# Patient Record
Sex: FEMALE | Race: WHITE | NOT HISPANIC OR LATINO | Employment: OTHER | URBAN - METROPOLITAN AREA
[De-identification: names, ages, dates, MRNs, and addresses within clinical notes are randomized per-mention and may not be internally consistent; named-entity substitution may affect disease eponyms.]

---

## 2017-01-06 ENCOUNTER — OFFICE VISIT (OUTPATIENT)
Dept: NEUROLOGY | Facility: CLINIC | Age: 82
End: 2017-01-06

## 2017-01-06 VITALS
WEIGHT: 166 LBS | SYSTOLIC BLOOD PRESSURE: 128 MMHG | HEIGHT: 65 IN | DIASTOLIC BLOOD PRESSURE: 70 MMHG | BODY MASS INDEX: 27.66 KG/M2

## 2017-01-06 DIAGNOSIS — F02.818 LATE ONSET ALZHEIMER'S DISEASE WITH BEHAVIORAL DISTURBANCE (HCC): Primary | ICD-10-CM

## 2017-01-06 DIAGNOSIS — G30.1 LATE ONSET ALZHEIMER'S DISEASE WITH BEHAVIORAL DISTURBANCE (HCC): Primary | ICD-10-CM

## 2017-01-06 PROCEDURE — 99213 OFFICE O/P EST LOW 20 MIN: CPT | Performed by: PHYSICIAN ASSISTANT

## 2017-01-06 NOTE — PROGRESS NOTES
"Subjective     History of Present Illness   Carmen Lopez is a 89 y.o. female who returns to clinic today for evaluation of Alzheimer's Disease . Additional symptoms have included impairments in orientation , language and executive function. She has had associated  symptoms of anxiety and delusions, which have improved significantly since starting mirtazapine. She is currently residing at [facility].      Prior evaluation has included screening blood work and a head CT  which was unremarkable . She is currently taking donepezil, memantine and mirtazapine.    Since her last visit in 7/16, she feels essentially unchanged and her family agrees.       The following portions of the patient's history were reviewed and updated as appropriate: allergies, current medications, past family history, past medical history, past social history, past surgical history and problem list.    Review of Systems   Constitutional: Negative.    HENT: Negative.    Eyes: Negative.    Respiratory: Negative.    Cardiovascular: Negative.    Gastrointestinal: Negative.    Endocrine: Negative.    Genitourinary: Negative.    Musculoskeletal: Negative.    Skin: Negative.    Allergic/Immunologic: Negative.    Neurological:        As noted in HPI   Hematological: Negative.    Psychiatric/Behavioral:        As noted in HPI       Objective     Visit Vitals   • /70   • Ht 65\" (165.1 cm)   • Wt 166 lb (75.3 kg)   • BMI 27.62 kg/m2       General appearance today is normal.       Physical Exam   Neurological: She has normal strength. She has a normal Finger-Nose-Finger Test.   Psychiatric: Her speech is normal.        Neurologic Exam     Mental Status   Oriented to person.   Disoriented to place.   Disoriented to time. Disoriented to year, month, date and day. Oriented to season.   Registration: recalls 3 of 3 objects. Recall of objects at 5 minutes: recalls 0 of 3 objects. Follows 3 step commands.   Attention: 0/5 sequencing.   Speech: speech is " normal   Level of consciousness: alert  Able to name object. Able to read. Able to repeat. Able to write.     Cranial Nerves   Cranial nerves II through XII intact.     Motor Exam   Muscle bulk: normal  Overall muscle tone: normal    Strength   Strength 5/5 throughout.     Sensory Exam   Light touch normal.     Gait, Coordination, and Reflexes     Coordination   Finger to nose coordination: normal    Tremor   Resting tremor: absent        Results  MMSE=13      Assessment/Plan   Carmen was seen today for alzheimer's disease.    Diagnoses and all orders for this visit:    Late onset Alzheimer's disease with behavioral disturbance          Discussion/Summary   Carmen Lopez returns to clinic today for evaluation of Alzheimer's Disease . I again reviewed her current status and treatment options. After discussing potential treatment options, it was elected to continue on  donepezil, memantine and mirtazapine unchanged. She will then follow up in 6 months, or sooner if needed.       I spent 15 minutes with the patient and family. I spent 80 percent of this time counseling and discussing evaluation, current status, treatment options and management.    As part of this visit I obtained additional history from the family which is incorporated in the HPI.    Annette Rizo PA-C

## 2017-01-06 NOTE — MR AVS SNAPSHOT
Carmen Lopez   1/6/2017 10:30 AM   Office Visit    Dept Phone:  [phone]   Encounter #:  [id number]    Provider:  Annette Rizo PA-C   Department:  [facility]                Your Full Care Plan              Your Updated Medication List          This list is accurate as of: 1/6/17 10:59 AM.  Always use your most recent med list.                ammonium lactate 12 % cream   Commonly known as:  AMLACTIN       aspirin 81 MG EC tablet       atorvastatin 10 MG tablet   Commonly known as:  LIPITOR       bisoprolol 5 MG tablet   Commonly known as:  ZEBeta       ciprofloxacin 0.3 % ophthalmic solution   Commonly known as:  CILOXAN       donepezil 10 MG tablet   Commonly known as:  ARICEPT   Take 1 tablet by mouth daily.       doxycycline 100 MG tablet   Commonly known as:  VIBRAMYICN       memantine 10 MG tablet   Commonly known as:  NAMENDA       mirtazapine 15 MG tablet   Commonly known as:  REMERON   Take 2 tablets by mouth every night.       Omeprazole-Sodium Bicarbonate  MG capsule       rivaroxaban 20 MG tablet   Commonly known as:  XARELTO       rosuvastatin 10 MG tablet   Commonly known as:  CRESTOR       sulfamethoxazole-trimethoprim 800-160 MG per tablet   Commonly known as:  BACTRIM DS,SEPTRA DS               Instructions     None    Patient Instructions History      Upcoming Appointments     Visit Type Date Time Department    FOLLOW UP 1/6/2017 10:30 AM [facility]      [employer] Signup     Our records indicate that you have an active [employer] account.    You can view your After Visit Summary by going to [email or web address] and logging in with your [employer] username and password.  If you don't have a [employer] username and password but a parent or guardian has access to your record, the parent or guardian should login with their own [employer] username and password and access your record to view the After Visit Summary.    If  "you have questions, you can email [email or web address] or call [phone] to talk to our MyChart staff.  Remember, plistat is NOT to be used for urgent needs.  For medical emergencies, dial 911.               Other Info from Your Visit           Allergies     No Known Allergies      Reason for Visit     Alzheimer's Disease           Vital Signs     Blood Pressure Height Weight Body Mass Index Smoking Status       128/70 65\" (165.1 cm) 166 lb (75.3 kg) 27.62 kg/m2 Never Smoker         "

## 2017-01-06 NOTE — LETTER
January 6, 2017     Vivienne Freeman MD  [address]  [facility] [address]    Patient: Carmen Lopez   YOB: 1926   Date of Visit: 1/6/2017       Dear Dr. Pete MD:    Thank you for referring Carmen Lopez to me for evaluation. Below are the relevant portions of my assessment and plan of care.                   If you have questions, please do not hesitate to call me. I look forward to following Carmen along with you.         Sincerely,        Annette Rizo PA-C        CC: No Recipients

## 2017-01-20 RX ORDER — MIRTAZAPINE 30 MG/1
TABLET, FILM COATED ORAL
Qty: 30 TABLET | Refills: 4 | Status: SHIPPED | OUTPATIENT
Start: 2017-01-20 | End: 2018-04-10 | Stop reason: SDUPTHER

## 2017-03-14 RX ORDER — MIRTAZAPINE 15 MG/1
TABLET, FILM COATED ORAL
Qty: 30 TABLET | Refills: 4 | Status: SHIPPED | OUTPATIENT
Start: 2017-03-14 | End: 2017-07-27 | Stop reason: SDUPTHER

## 2017-05-08 RX ORDER — MEMANTINE HYDROCHLORIDE 28 MG/1
CAPSULE, EXTENDED RELEASE ORAL
Qty: 30 CAPSULE | Refills: 0 | Status: SHIPPED | OUTPATIENT
Start: 2017-05-08 | End: 2018-01-23

## 2017-06-08 RX ORDER — DONEPEZIL HYDROCHLORIDE 10 MG/1
TABLET, FILM COATED ORAL
Qty: 30 TABLET | Refills: 10 | Status: SHIPPED | OUTPATIENT
Start: 2017-06-08 | End: 2018-03-01 | Stop reason: SDUPTHER

## 2017-07-07 ENCOUNTER — OFFICE VISIT (OUTPATIENT)
Dept: NEUROLOGY | Facility: CLINIC | Age: 82
End: 2017-07-07

## 2017-07-07 VITALS
HEIGHT: 65 IN | BODY MASS INDEX: 26.66 KG/M2 | WEIGHT: 160 LBS | SYSTOLIC BLOOD PRESSURE: 126 MMHG | DIASTOLIC BLOOD PRESSURE: 82 MMHG

## 2017-07-07 DIAGNOSIS — F02.818 LATE ONSET ALZHEIMER'S DISEASE WITH BEHAVIORAL DISTURBANCE (HCC): Primary | ICD-10-CM

## 2017-07-07 DIAGNOSIS — G30.1 LATE ONSET ALZHEIMER'S DISEASE WITH BEHAVIORAL DISTURBANCE (HCC): Primary | ICD-10-CM

## 2017-07-07 PROCEDURE — 99214 OFFICE O/P EST MOD 30 MIN: CPT | Performed by: PSYCHIATRY & NEUROLOGY

## 2017-07-07 NOTE — PROGRESS NOTES
"Subjective      CC: dementia    History of Present Illness   Carmen Lopez is a 89 y.o. female who returns to clinic today with a history of Alzheimer's Disease . Additional symptoms have included impairments in orientation , language and executive function. She has had associated  symptoms of anxiety and delusions in the past, which have improved significantly since starting mirtazapine. She is not currently having significant BPSD. She is currently residing at [facility] and this is going well.     Prior evaluation has included screening blood work and a head CT  which was unremarkable . She is currently taking donepezil, memantine and mirtazapine.    Since her last visit on 1/6/17, she feels well. Her son does not more difficulty with anomia. She is now using a walker and doing well.       The following portions of the patient's history were reviewed and updated as appropriate: allergies, current medications, past family history, past medical history, past social history, past surgical history and problem list.    Review of Systems   Constitutional: Negative for diaphoresis, fatigue and fever.   Respiratory: Negative for shortness of breath.    Cardiovascular: Negative for chest pain and palpitations.   Gastrointestinal: Negative for abdominal pain, nausea and vomiting.   Musculoskeletal: Negative for back pain and neck pain.   Neurological: Negative for dizziness, syncope, weakness, light-headedness and headaches.   Psychiatric/Behavioral: Positive for confusion.       Objective     /82  Ht 65\" (165.1 cm)  Wt 160 lb (72.6 kg)  BMI 26.63 kg/m2    General appearance today is normal.       Physical Exam   Neurological: She has normal strength.   Psychiatric: Her speech is normal.        Neurologic Exam     Mental Status   Oriented to person.   Disoriented to place.   Disoriented to time.   Recall of objects at 5 minutes: recalls 0 of 3 objects. Follows 2 step commands.   Attention: normal.   Speech: " speech is normal   Level of consciousness: alert  Unable to name object. Unable to repeat. Normal comprehension.     Cranial Nerves   Cranial nerves II through XII intact.     Motor Exam   Muscle bulk: normal  Overall muscle tone: normal    Strength   Strength 5/5 throughout.     Sensory Exam   Light touch normal.         Results  MMSE=untestable      Assessment/Plan   Carmen was seen today for memory loss.    Diagnoses and all orders for this visit:    Late onset Alzheimer's disease with behavioral disturbance        Discussion/Summary   Carmen Lopez returns to clinic today for evaluation of Alzheimer's Disease . I again reviewed her current status and treatment options. After discussing potential treatment options, it was elected to continue on  donepezil, memantine and mirtazapine unchanged. She will then follow up in 6 months, or sooner if needed.       I spent 25 minutes face-to-face with the patient and family. I spent 15 minutes of this time counseling and discussing evaluation, current status, treatment options, management and research and risk factors.    As part of this visit I obtained additional history from the family which is incorporated in the HPI.    Ceasar Dunn MD

## 2017-07-27 RX ORDER — MIRTAZAPINE 15 MG/1
TABLET, FILM COATED ORAL
Qty: 30 TABLET | Refills: 3 | Status: SHIPPED | OUTPATIENT
Start: 2017-07-27 | End: 2017-11-27 | Stop reason: SDUPTHER

## 2017-11-06 ENCOUNTER — TELEPHONE (OUTPATIENT)
Dept: URGENT CARE | Facility: CLINIC | Age: 82
End: 2017-11-06

## 2017-11-27 RX ORDER — MIRTAZAPINE 15 MG/1
TABLET, FILM COATED ORAL
Qty: 90 TABLET | Refills: 2 | Status: SHIPPED | OUTPATIENT
Start: 2017-11-27 | End: 2018-04-10 | Stop reason: SDUPTHER

## 2018-01-23 ENCOUNTER — OFFICE VISIT (OUTPATIENT)
Dept: NEUROLOGY | Facility: CLINIC | Age: 83
End: 2018-01-23

## 2018-01-23 VITALS — HEIGHT: 65 IN | BODY MASS INDEX: 26.66 KG/M2 | WEIGHT: 160 LBS

## 2018-01-23 DIAGNOSIS — F02.818 LATE ONSET ALZHEIMER'S DISEASE WITH BEHAVIORAL DISTURBANCE (HCC): Primary | ICD-10-CM

## 2018-01-23 DIAGNOSIS — G30.1 LATE ONSET ALZHEIMER'S DISEASE WITH BEHAVIORAL DISTURBANCE (HCC): Primary | ICD-10-CM

## 2018-01-23 PROCEDURE — 99214 OFFICE O/P EST MOD 30 MIN: CPT | Performed by: PHYSICIAN ASSISTANT

## 2018-01-23 RX ORDER — MEMANTINE HYDROCHLORIDE 10 MG/1
20 TABLET ORAL DAILY
Qty: 60 TABLET | Refills: 11 | Status: SHIPPED | OUTPATIENT
Start: 2018-01-23 | End: 2018-09-24 | Stop reason: SDUPTHER

## 2018-01-23 NOTE — PROGRESS NOTES
"Subjective     Chief Complaint: memory loss     History of Present Illness   Carmen Lopez is a 89 y.o. female who returns to clinic today with a history of Alzheimer's Disease. Additional symptoms have included impairments in orientation, language and executive function. She has had associated symptoms of anxiety and delusions in the past, which have improved significantly since starting mirtazapine. She is not currently having significant BPSD. She is currently residing at [facility] and this is going well.      Prior evaluation has included screening blood work and a head CT  which was unremarkable. She is currently taking donepezil, memantine and mirtazapine.    Since her last visit in 7/17, she feels essentially unchanged. Her family has noted a continued cognitive decline.       I have reviewed and confirmed the past family, social and medical history as accurate on 1/23/18.     Review of Systems   Constitutional: Negative.    HENT: Negative.    Eyes: Negative.    Respiratory: Negative.    Cardiovascular: Negative.    Gastrointestinal: Negative.    Endocrine: Negative.    Genitourinary: Negative.    Musculoskeletal: Negative.    Skin: Negative.    Allergic/Immunologic: Negative.    Neurological:        As noted in HPI   Hematological: Negative.    Psychiatric/Behavioral:        As noted in HPI       Objective     Ht 165.1 cm (65\")  Wt 72.6 kg (160 lb)  BMI 26.63 kg/m2    General appearance today is normal.       Physical Exam   Neurological: She has normal strength. She has a normal Finger-Nose-Finger Test.   Psychiatric: Her speech is normal.        Neurologic Exam     Mental Status   Speech: speech is normal   Level of consciousness: alert  Normal comprehension.     Cranial Nerves   Cranial nerves II through XII intact.     Motor Exam   Muscle bulk: normal  Overall muscle tone: normal    Strength   Strength 5/5 throughout.     Sensory Exam   Light touch normal.     Gait, Coordination, and Reflexes "     Coordination   Finger to nose coordination: normal    Tremor   Resting tremor: absent        Results  MMSE=untestable      Assessment/Plan   Carmen was seen today for alzheimer's disease.    Diagnoses and all orders for this visit:    Late onset Alzheimer's disease with behavioral disturbance          Discussion/Summary   Carmen Lopez returns to clinic today for evaluation of Alzheimer's Disease . I again reviewed her current status and treatment options.  After discussing potential treatment options, it was elected to continue on  donepezil, memantine and mirtazapine unchanged as she is doing well overall. She will then follow up in 6 months, or sooner if needed.       I spent 15 minutes out of 25 minutes face to face with the patient and family and discussing diagnosis, prognosis, evaluation, current status, treatment options and management.    As part of this visit I obtained additional history from the family which is incorporated in the HPI.      Annette Rizo PA-C

## 2018-03-01 RX ORDER — DONEPEZIL HYDROCHLORIDE 10 MG/1
TABLET, FILM COATED ORAL
Qty: 90 TABLET | Refills: 9 | Status: SHIPPED | OUTPATIENT
Start: 2018-03-01 | End: 2018-09-24 | Stop reason: SDUPTHER

## 2018-04-10 RX ORDER — MIRTAZAPINE 15 MG/1
15 TABLET, FILM COATED ORAL EVERY MORNING
Qty: 90 TABLET | Refills: 2 | Status: SHIPPED | OUTPATIENT
Start: 2018-04-10 | End: 2018-09-24 | Stop reason: SDUPTHER

## 2018-04-10 RX ORDER — MIRTAZAPINE 30 MG/1
30 TABLET, FILM COATED ORAL
Qty: 30 TABLET | Refills: 4 | Status: SHIPPED | OUTPATIENT
Start: 2018-04-10 | End: 2018-09-24 | Stop reason: SDUPTHER

## 2018-09-24 ENCOUNTER — OFFICE VISIT (OUTPATIENT)
Dept: NEUROLOGY | Facility: CLINIC | Age: 83
End: 2018-09-24

## 2018-09-24 VITALS
WEIGHT: 160 LBS | DIASTOLIC BLOOD PRESSURE: 58 MMHG | HEIGHT: 65 IN | SYSTOLIC BLOOD PRESSURE: 112 MMHG | BODY MASS INDEX: 26.66 KG/M2

## 2018-09-24 DIAGNOSIS — G30.1 LATE ONSET ALZHEIMER'S DISEASE WITH BEHAVIORAL DISTURBANCE (HCC): Primary | ICD-10-CM

## 2018-09-24 DIAGNOSIS — F02.818 LATE ONSET ALZHEIMER'S DISEASE WITH BEHAVIORAL DISTURBANCE (HCC): Primary | ICD-10-CM

## 2018-09-24 PROCEDURE — 99213 OFFICE O/P EST LOW 20 MIN: CPT | Performed by: PSYCHIATRY & NEUROLOGY

## 2018-09-24 RX ORDER — MIRTAZAPINE 30 MG/1
30 TABLET, FILM COATED ORAL
Qty: 30 TABLET | Refills: 4 | Status: SHIPPED | OUTPATIENT
Start: 2018-09-24 | End: 2019-09-19 | Stop reason: SDUPTHER

## 2018-09-24 RX ORDER — DONEPEZIL HYDROCHLORIDE 10 MG/1
10 TABLET, FILM COATED ORAL DAILY
Qty: 90 TABLET | Refills: 9 | Status: SHIPPED | OUTPATIENT
Start: 2018-09-24 | End: 2019-10-17 | Stop reason: SDUPTHER

## 2018-09-24 RX ORDER — MIRTAZAPINE 15 MG/1
15 TABLET, FILM COATED ORAL EVERY MORNING
Qty: 90 TABLET | Refills: 2 | Status: SHIPPED | OUTPATIENT
Start: 2018-09-24 | End: 2019-10-17 | Stop reason: SDUPTHER

## 2018-09-24 RX ORDER — MEMANTINE HYDROCHLORIDE 10 MG/1
20 TABLET ORAL DAILY
Qty: 60 TABLET | Refills: 11 | Status: SHIPPED | OUTPATIENT
Start: 2018-09-24 | End: 2019-10-16 | Stop reason: SDUPTHER

## 2018-09-24 NOTE — PROGRESS NOTES
"Subjective     Chief Complaint: memory loss     History of Present Illness   Carmen Lopez is a 89 y.o. female who returns to clinic today with a history of Alzheimer's Disease. Additional symptoms have included impairments in orientation, language and executive function. She has had associated symptoms of anxiety and delusions in the past, which have improved significantly since starting mirtazapine.  She is currently residing at [facility] and this is going well.      Prior evaluation has included screening blood work and a head CT  which was unremarkable. She is currently taking donepezil, memantine and mirtazapine.    Since her last visit on 1/23/18, she continues to reside at [facility]. Her family continues to note progressive widespread cognitive impairment. She is not currently having any BPSD, and is largely unchanged since 1/18.      I have reviewed and confirmed the past family, social and medical history as accurate on 9/24/18.     Review of Systems   Constitutional: Negative.        Objective     /58   Ht 165.1 cm (65\")   Wt 72.6 kg (160 lb)   BMI 26.63 kg/m²     General appearance today is normal.       Physical Exam   Psychiatric: Her speech is normal.        Neurologic Exam     Mental Status   Oriented to person.   Disoriented to place.   Disoriented to time.   Recall of objects at 5 minutes: 0/3.   Attention: normal.   Speech: speech is normal   Level of consciousness: alert  Able to name object. Able to read. Able to repeat. Able to write. Normal comprehension.     Cranial Nerves   Cranial nerves II through XII intact.         Results  MMSE=7      Assessment/Plan   Diagnoses and all orders for this visit:    Late onset Alzheimer's disease with behavioral disturbance    Other orders  -     mirtazapine (REMERON) 30 MG tablet; Take 1 tablet by mouth every night at bedtime.  -     mirtazapine (REMERON) 15 MG tablet; Take 1 tablet by mouth Every Morning.  -     memantine (NAMENDA) 10 MG " tablet; Take 2 tablets by mouth Daily.  -     donepezil (ARICEPT) 10 MG tablet; Take 1 tablet by mouth Daily.          Discussion/Summary   Carmen Lopez returns to clinic today for evaluation of Alzheimer's Disease . I again reviewed her current status and treatment options.  After discussing potential treatment options, it was elected to continue on  donepezil, memantine and mirtazapine unchanged as she is doing well overall. She will then follow up in 6 months, or sooner if needed.       I spent 15 minutes out of 25 minutes face to face with the patient and family and discussing current status, treatment options and management.    As part of this visit I obtained additional history from the family which is incorporated in the HPI.      Ceasar Dunn MD

## 2019-03-25 ENCOUNTER — OFFICE VISIT (OUTPATIENT)
Dept: NEUROLOGY | Facility: CLINIC | Age: 84
End: 2019-03-25

## 2019-03-25 VITALS — WEIGHT: 160 LBS | BODY MASS INDEX: 26.66 KG/M2 | HEIGHT: 65 IN

## 2019-03-25 DIAGNOSIS — G30.1 LATE ONSET ALZHEIMER'S DISEASE WITH BEHAVIORAL DISTURBANCE (HCC): Primary | ICD-10-CM

## 2019-03-25 DIAGNOSIS — F02.818 LATE ONSET ALZHEIMER'S DISEASE WITH BEHAVIORAL DISTURBANCE (HCC): Primary | ICD-10-CM

## 2019-03-25 PROCEDURE — 99214 OFFICE O/P EST MOD 30 MIN: CPT | Performed by: PHYSICIAN ASSISTANT

## 2019-03-25 RX ORDER — RIVAROXABAN 10 MG/1
TABLET, FILM COATED ORAL
COMMUNITY
Start: 2019-03-10

## 2019-03-25 RX ORDER — LISINOPRIL 5 MG/1
TABLET ORAL
COMMUNITY
Start: 2019-01-07

## 2019-03-25 NOTE — PROGRESS NOTES
Subjective     Chief Complaint: memory loss      History of Present Illness   Carmen Lopez is a 89 y.o. female who  returns to clinic today with a history of Alzheimer's Disease. Additional symptoms have included impairments in orientation, language and executive function. She has had associated symptoms of anxiety and delusions in the past, which have improved significantly since starting mirtazapine.  She is currently residing at [facility] and this is going well.      Prior evaluation has included screening blood work and a head CT which was unremarkable. She is currently taking donepezil, memantine and mirtazapine.    Today: Since her last visit in 9/18, her family notes a continued widespread cognitive decline.       I have reviewed and confirmed the past family, social and medical history as accurate on 3/25/19.     Review of Systems   Unable to perform ROS: Dementia       Objective     There were no vitals taken for this visit.    General appearance today is normal.     Physical Exam     Neurologic Exam     Mental Status   Oriented to person.   Disoriented to place.   Disoriented to time.   Registration of memory: 0/3 recall. Follows 1 step commands.   Attention: decreased.   Level of consciousness: alert  Unable to name object. Unable to read. Unable to repeat. Unable to write. Abnormal comprehension.     Cranial Nerves   Cranial nerves II through XII intact.     Motor Exam   Muscle bulk: normal  Overall muscle tone: normalGrips hands equally bilaterally      Gait, Coordination, and Reflexes     Gait  Gait: (in wheelchair )    Tremor   Resting tremor: absent        Results  MMSE=untestable       Assessment/Plan   Carmen was seen today for alzheimer's disease.    Diagnoses and all orders for this visit:    Late onset Alzheimer's disease with behavioral disturbance          Discussion/Summary   Carmen Lopez returns to clinic today with a history of Alzheimer's Disease . I again reviewed her current  status and treatment options. After discussing potential treatment options, it was elected to continue on  donepezil, memantine and mirtazapine. She will then follow up in 6 months, or sooner if needed.   I spent 25 minutes face to face with the patient and family with 15 minutes spent on discussing diagnosis, prognosis, evaluation, current status, treatment options and management as discussed above.       As part of this visit I obtained additional history from the family which is incorporated in the HPI.      Annette Rizo PA-C

## 2019-04-21 ENCOUNTER — HOSPITAL ENCOUNTER (EMERGENCY)
Facility: HOSPITAL | Age: 84
Discharge: HOME | End: 2019-04-22
Attending: EMERGENCY MEDICINE | Admitting: EMERGENCY MEDICINE

## 2019-04-21 DIAGNOSIS — S09.90XA INJURY OF HEAD, INITIAL ENCOUNTER: Primary | ICD-10-CM

## 2019-04-21 PROCEDURE — 96374 THER/PROPH/DIAG INJ IV PUSH: CPT

## 2019-04-21 PROCEDURE — 93005 ELECTROCARDIOGRAM TRACING: CPT | Performed by: EMERGENCY MEDICINE

## 2019-04-21 PROCEDURE — 99284 EMERGENCY DEPT VISIT MOD MDM: CPT

## 2019-04-21 PROCEDURE — 93005 ELECTROCARDIOGRAM TRACING: CPT

## 2019-04-22 ENCOUNTER — APPOINTMENT (OUTPATIENT)
Dept: CT IMAGING | Facility: HOSPITAL | Age: 84
End: 2019-04-22

## 2019-04-22 VITALS
BODY MASS INDEX: 25.11 KG/M2 | HEIGHT: 67 IN | SYSTOLIC BLOOD PRESSURE: 118 MMHG | WEIGHT: 160 LBS | RESPIRATION RATE: 14 BRPM | HEART RATE: 92 BPM | DIASTOLIC BLOOD PRESSURE: 72 MMHG | TEMPERATURE: 97.8 F | OXYGEN SATURATION: 94 %

## 2019-04-22 PROCEDURE — 70486 CT MAXILLOFACIAL W/O DYE: CPT

## 2019-04-22 PROCEDURE — 25010000002 LORAZEPAM PER 2 MG: Performed by: EMERGENCY MEDICINE

## 2019-04-22 PROCEDURE — 70450 CT HEAD/BRAIN W/O DYE: CPT

## 2019-04-22 PROCEDURE — 96374 THER/PROPH/DIAG INJ IV PUSH: CPT

## 2019-04-22 RX ORDER — SILICONE ADHESIVE 1.5" X 3"
1 SHEET (EA) TOPICAL AS NEEDED
COMMUNITY

## 2019-04-22 RX ORDER — ALBUTEROL SULFATE 90 UG/1
2 AEROSOL, METERED RESPIRATORY (INHALATION) EVERY 4 HOURS PRN
COMMUNITY

## 2019-04-22 RX ORDER — ACETAMINOPHEN 500 MG
500 TABLET ORAL
COMMUNITY

## 2019-04-22 RX ORDER — LOPERAMIDE HYDROCHLORIDE 2 MG/1
2 CAPSULE ORAL 4 TIMES DAILY PRN
COMMUNITY

## 2019-04-22 RX ORDER — MIRTAZAPINE 15 MG/1
15 TABLET, FILM COATED ORAL NIGHTLY
COMMUNITY

## 2019-04-22 RX ORDER — MEMANTINE HYDROCHLORIDE 10 MG/1
10 TABLET ORAL 2 TIMES DAILY
COMMUNITY

## 2019-04-22 RX ORDER — MIRTAZAPINE 30 MG/1
30 TABLET, FILM COATED ORAL NIGHTLY
COMMUNITY

## 2019-04-22 RX ORDER — LORAZEPAM 2 MG/ML
0.5 INJECTION INTRAMUSCULAR ONCE
Status: COMPLETED | OUTPATIENT
Start: 2019-04-22 | End: 2019-04-22

## 2019-04-22 RX ORDER — DONEPEZIL HYDROCHLORIDE 10 MG/1
10 TABLET, FILM COATED ORAL NIGHTLY
COMMUNITY

## 2019-04-22 RX ORDER — ATORVASTATIN CALCIUM 10 MG/1
10 TABLET, FILM COATED ORAL DAILY
COMMUNITY

## 2019-04-22 RX ADMIN — LORAZEPAM 0.5 MG: 2 INJECTION INTRAMUSCULAR; INTRAVENOUS at 01:15

## 2019-04-22 RX ADMIN — LORAZEPAM 0.5 MG: 2 INJECTION INTRAMUSCULAR; INTRAVENOUS at 01:05

## 2019-04-23 ENCOUNTER — TELEPHONE (OUTPATIENT)
Dept: NEUROLOGY | Facility: CLINIC | Age: 84
End: 2019-04-23

## 2019-04-23 NOTE — TELEPHONE ENCOUNTER
Called Omer back and the question has to do with organ donation, specifically his mother's brain. They are starting to prepare things for her eventual death and he was unclear about whether this was something they should look into due to her Alzheimer's?   If there is a reason she would need to donate her brain for research purposes, please let them know.     Also,  She is stage 5, can still swallow and feed herself, despite her fall she had a good day that day. She was very animated and vocal, demonstrated a willfulness at the hospital afterwards they don't usually see. They had to sedate her for the scan. Basically would also like to know do they need to be thinking about Hospice care? [facility] does it there, but these are some of the things he would like to discuss are what is the process to get this started and where should he have this done etc.    Note:Mentioned possibly speaking with our , Yuni Sepulveda about some of these things. States not urgent but when she is able.    Omer's Ph# [phone]    Last update, her PCP () will follow up with her this Friday.

## 2019-04-23 NOTE — TELEPHONE ENCOUNTER
Son, Omer called pertaining to a fall his mother had. They took her to the ER and she has had a CT scan of her head. Just wanted to make  aware and also mentioned having a question about procedures that may need to be followed up upon her death whenever that may occur?    Requested a cb# [phone]

## 2019-04-24 NOTE — TELEPHONE ENCOUNTER
[facility] and she had a lengthy convo about all these things yesterday-she did a lot of appropriate disease and end of life education. Dr. Dunn, I will contact son tomorrow.

## 2019-05-01 ENCOUNTER — TELEPHONE (OUTPATIENT)
Dept: NEUROLOGY | Facility: CLINIC | Age: 84
End: 2019-05-01

## 2019-05-01 NOTE — TELEPHONE ENCOUNTER
Phone call with son, Omer, returning his call from last week. He spoke with Jeffry  last week and had careplan with staff on Monday so I was waiting to call after that to answer any further questions. He said the careplan meeting addressed his questions about how to carry out her wishes in advance directive, knowing when to make such decisions and understands she's not hospice appropriate yet. He understands he can call us if symptoms change or has questions about to address treatment/withholding treatment and we can all discuss together.

## 2019-09-20 ENCOUNTER — TELEPHONE (OUTPATIENT)
Dept: NEUROLOGY | Facility: CLINIC | Age: 84
End: 2019-09-20

## 2019-09-20 RX ORDER — MIRTAZAPINE 30 MG/1
TABLET, FILM COATED ORAL
Qty: 30 TABLET | Refills: 3 | Status: SHIPPED | OUTPATIENT
Start: 2019-09-20 | End: 2019-10-17 | Stop reason: SDUPTHER

## 2019-10-03 ENCOUNTER — TELEPHONE (OUTPATIENT)
Dept: NEUROLOGY | Facility: CLINIC | Age: 84
End: 2019-10-03

## 2019-10-04 ENCOUNTER — TELEPHONE (OUTPATIENT)
Dept: NEUROLOGY | Facility: CLINIC | Age: 84
End: 2019-10-04

## 2019-10-04 NOTE — TELEPHONE ENCOUNTER
Called and spoke with pt son Omer regarding pt moving to [facility]. Omer stated that pt has not moved as the are still awaiting availability for a bed. Omer stated that he will call once pt is moved and informed that will follow up in a few weeks. Thanks.

## 2019-10-10 NOTE — TELEPHONE ENCOUNTER
Called and spoke with pt son Omer regarding pt moving to [facility] [place] [facility] on 10[facility]. Omer stated that pt hasn't moved and will call once pt is moved. Will follow up with pt son in a few weeks. Thanks.

## 2019-10-17 ENCOUNTER — TELEPHONE (OUTPATIENT)
Dept: NEUROLOGY | Facility: CLINIC | Age: 84
End: 2019-10-17

## 2019-10-17 RX ORDER — MIRTAZAPINE 30 MG/1
30 TABLET, FILM COATED ORAL
Qty: 90 TABLET | Refills: 3 | Status: SHIPPED | OUTPATIENT
Start: 2019-10-17

## 2019-10-17 RX ORDER — MIRTAZAPINE 15 MG/1
15 TABLET, FILM COATED ORAL EVERY MORNING
Qty: 90 TABLET | Refills: 2 | Status: SHIPPED | OUTPATIENT
Start: 2019-10-17

## 2019-10-17 RX ORDER — MEMANTINE HYDROCHLORIDE 10 MG/1
TABLET ORAL
Qty: 60 TABLET | Refills: 2 | Status: SHIPPED | OUTPATIENT
Start: 2019-10-17

## 2019-10-17 RX ORDER — DONEPEZIL HYDROCHLORIDE 10 MG/1
10 TABLET, FILM COATED ORAL DAILY
Qty: 90 TABLET | Refills: 9 | Status: SHIPPED | OUTPATIENT
Start: 2019-10-17

## 2019-10-17 RX ORDER — MIRTAZAPINE 15 MG/1
TABLET, FILM COATED ORAL
Qty: 30 TABLET | Refills: 1 | Status: SHIPPED | OUTPATIENT
Start: 2019-10-17

## 2019-10-17 NOTE — TELEPHONE ENCOUNTER
I think this patient has 2 medical records. Ours is listed under Carmen Lopez, with the same . I sent in refills for all Rx's, but let them know that when they go to the pharmacy they can ask for just the quantity they want rather than the full Rx if they prefer. Thanks.

## 2019-10-17 NOTE — TELEPHONE ENCOUNTER
Called and spoke to pt son Omer informing that pt refill Rx's has been sent into pt pharmacy and that when they go to pharmacy to  refills, they can ask pharmacy to fill quantity needed. Omer stated that he is needing refills sent to [facility] pharmacy, [place], [place]. Thanks.

## 2019-10-17 NOTE — TELEPHONE ENCOUNTER
Called and spoke to pt son Omer and verified pharmacy refills were sent and confirmed medications sent to pt pharmacy Rx donepezil 10mg 1 tab po daily, mirtazapine 15 mg 1 tab taken in every morning, and mirtazapine 30 mg 1 tab po qhs. Omer stated understanding and appreciating for sending refills. Thanks.

## 2019-10-17 NOTE — TELEPHONE ENCOUNTER
Called and spoke to pt son Luis Alberto informing that pt is needing to schedule a follow-up appt to continue getting medications refilled. Luis Alberto stated that pt is moving to the [facility] [place] within the next week which the facility has a van that can transport pt to appt. Luis Alberto stated that he will need appt to be about a week out and will go ahead and schedule pt appt. Informed Luis Alberto to ask  for availability for a week or two out. Luis Alberto stated understanding and transferred call for scheduling. Thanks.

## 2019-10-17 NOTE — TELEPHONE ENCOUNTER
----- Message from Casie Lorenzo sent at 10/17/2019  9:45 AM EDT -----  Contact: Omer [phone]  Omer Hall called in requesting refills on Rx donepezil (ARICEPT) 10 MG tablet , Rx mirtazapine (REMERON) 15 MG, and Rx  mirtazapine (REMERON) 30 MG . He only needs about 7 days of each. Please call back today.

## 2019-11-07 ENCOUNTER — OFFICE VISIT (OUTPATIENT)
Dept: NEUROLOGY | Facility: CLINIC | Age: 84
End: 2019-11-07

## 2019-11-07 VITALS — OXYGEN SATURATION: 90 % | HEART RATE: 70 BPM | WEIGHT: 160 LBS | BODY MASS INDEX: 26.66 KG/M2 | HEIGHT: 65 IN

## 2019-11-07 DIAGNOSIS — F02.818 LATE ONSET ALZHEIMER'S DISEASE WITH BEHAVIORAL DISTURBANCE (HCC): Primary | ICD-10-CM

## 2019-11-07 DIAGNOSIS — G30.1 LATE ONSET ALZHEIMER'S DISEASE WITH BEHAVIORAL DISTURBANCE (HCC): Primary | ICD-10-CM

## 2019-11-07 PROCEDURE — 99214 OFFICE O/P EST MOD 30 MIN: CPT | Performed by: PHYSICIAN ASSISTANT

## 2019-11-07 NOTE — PROGRESS NOTES
"Subjective     Chief Complaint: memory loss      History of Present Illness   Carmen Lopez is a 89 y.o. female who returns to clinic today with a history of Alzheimer's Disease. Additional symptoms have included impairments in orientation, language and executive function. She has had associated symptoms of anxiety and delusions in the past, which have improved significantly since starting mirtazapine.      Prior evaluation has included screening blood work and a head CT which was unremarkable. She is currently taking donepezil, memantine and mirtazapine.    Today: Since her last visit in 3/19, her family has noted a continued cognitive decline. She is now at the [facility] in skilled nursing.       I have reviewed and confirmed the past family, social and medical history as accurate on 11/7/19.     Review of Systems   Unable to perform ROS: Dementia       Objective     Pulse 70   Ht 165.1 cm (65\")   Wt 72.6 kg (160 lb)   SpO2 90%   BMI 26.63 kg/m²     General appearance today is normal.       Physical Exam     Neurologic Exam     Mental Status   Oriented to person.   Disoriented to place.   Disoriented to time.   Attention: decreased.   Level of consciousness: alert  Unable to name object. Unable to read. Unable to repeat. Unable to write. Abnormal comprehension.     Cranial Nerves   Cranial nerves II through XII intact.     Motor Exam   Muscle bulk: normal  Overall muscle tone: normalGrips hands equally bilaterally       Gait, Coordination, and Reflexes     Gait  Gait: (nonambulatory in wheelchair )    Tremor   Resting tremor: absent        Results  MMSE=untestable       Assessment/Plan   Carmen was seen today for alzheimer's disease.    Diagnoses and all orders for this visit:    Late onset Alzheimer's disease with behavioral disturbance (CMS/HCC)          Discussion/Summary   Carmen Lopez returns to clinic today for evaluation of Alzheimer's Disease . I again reviewed her current status and treatment " options. After discussing potential treatment options, it was elected to taper her donepezil down to discontinuation and  continue on  memantine unchanged for now, though with the plans of potentially discontinuing this as well in the near future.She will then follow up in 6 months, or sooner if needed.   I spent 25 minutes face to face with the patient and family with 20 minutes spent on discussing diagnosis, prognosis, evaluation, current status, treatment options, management and end of life issues as discussed above.       As part of this visit I obtained additional history from the family which is incorporated in the HPI.      Annette Rizo PA-C

## 2019-11-22 ENCOUNTER — TELEPHONE (OUTPATIENT)
Dept: NEUROLOGY | Facility: CLINIC | Age: 84
End: 2019-11-22

## 2019-11-22 NOTE — TELEPHONE ENCOUNTER
It would be reasonable to decrease it to 30mg BID if they would like, though we may need to increase if agitation/anxiety worsened. Thanks

## 2019-11-22 NOTE — TELEPHONE ENCOUNTER
Son, Omer states he had been instructed to call after decreasing and possibly D/Cing his mother's donepezil. States the Nurses at [facility] said no discernable changes after D/Cing donepezil at this point. When he went to visit she was in a good mood, smiling, attentive 90% of the time. No sundowning issues recently either.    Annette,  They have not yet reduced the mirtazapine, but wanted to see what you thought about possibly decreasing this for a little bit to see if that helps as well since he states this may be crazy but is concerned it may have something to do with her unresponsiveness at times and overall sleepiness or this could just be the disease. Her current Mirtazapine dose is 15mg qAM and 30mg qPM. Would you recommend trying to decrease this?

## 2019-11-22 NOTE — TELEPHONE ENCOUNTER
Called the [facility] in [place] [phone] and gave a verbal order to nurse, Syeda (pt lives in the [address] [place] [address]) for Mirtazapine decrease: 15mg qAM and 15mg qPM. She stated understanding and will start this evening.

## 2019-11-27 ENCOUNTER — TELEPHONE (OUTPATIENT)
Dept: NEUROLOGY | Facility: CLINIC | Age: 84
End: 2019-11-27

## 2019-12-02 NOTE — TELEPHONE ENCOUNTER
He does not think we should D/C the Mirtazapine . Been to visit her 3 times and she seems to be improving, less withdrawn but speaking in complete sentences
